# Patient Record
Sex: MALE | Race: WHITE | Employment: OTHER | URBAN - METROPOLITAN AREA
[De-identification: names, ages, dates, MRNs, and addresses within clinical notes are randomized per-mention and may not be internally consistent; named-entity substitution may affect disease eponyms.]

---

## 2018-10-11 ENCOUNTER — TRANSFERRED RECORDS (OUTPATIENT)
Dept: HEALTH INFORMATION MANAGEMENT | Facility: CLINIC | Age: 78
End: 2018-10-11

## 2018-10-17 ENCOUNTER — OFFICE VISIT (OUTPATIENT)
Dept: SURGERY | Facility: OTHER | Age: 78
End: 2018-10-17
Attending: SURGERY
Payer: COMMERCIAL

## 2018-10-17 VITALS
BODY MASS INDEX: 28.45 KG/M2 | SYSTOLIC BLOOD PRESSURE: 100 MMHG | TEMPERATURE: 97.6 F | HEIGHT: 66 IN | OXYGEN SATURATION: 97 % | HEART RATE: 75 BPM | WEIGHT: 177 LBS | DIASTOLIC BLOOD PRESSURE: 66 MMHG

## 2018-10-17 DIAGNOSIS — N52.9 IMPOTENCE OF ORGANIC ORIGIN: ICD-10-CM

## 2018-10-17 DIAGNOSIS — Z22.7 LATENT TUBERCULOSIS BY SKIN TEST: ICD-10-CM

## 2018-10-17 DIAGNOSIS — K40.90 INGUINAL HERNIA OF LEFT SIDE WITHOUT OBSTRUCTION OR GANGRENE: ICD-10-CM

## 2018-10-17 DIAGNOSIS — I70.8 AORTO-ILIAC ATHEROSCLEROSIS (H): ICD-10-CM

## 2018-10-17 DIAGNOSIS — N18.30 CHRONIC KIDNEY DISEASE, STAGE 3 (H): ICD-10-CM

## 2018-10-17 DIAGNOSIS — M19.90 OSTEOARTHRITIS: ICD-10-CM

## 2018-10-17 DIAGNOSIS — M51.26 DISPLACEMENT OF LUMBAR INTERVERTEBRAL DISC WITHOUT MYELOPATHY: ICD-10-CM

## 2018-10-17 DIAGNOSIS — I48.0 PAROXYSMAL ATRIAL FIBRILLATION WITH RVR (H): ICD-10-CM

## 2018-10-17 DIAGNOSIS — K63.5 COLON POLYP: ICD-10-CM

## 2018-10-17 DIAGNOSIS — S83.212A BUCKET HANDLE TEAR OF MEDIAL MENISCUS OF LEFT KNEE: ICD-10-CM

## 2018-10-17 DIAGNOSIS — Z95.5 HISTORY OF HEART ARTERY STENT: ICD-10-CM

## 2018-10-17 DIAGNOSIS — E78.49 OTHER HYPERLIPIDEMIA: ICD-10-CM

## 2018-10-17 DIAGNOSIS — I70.0 AORTO-ILIAC ATHEROSCLEROSIS (H): ICD-10-CM

## 2018-10-17 DIAGNOSIS — K40.91 UNILATERAL RECURRENT INGUINAL HERNIA WITHOUT OBSTRUCTION OR GANGRENE: Primary | ICD-10-CM

## 2018-10-17 DIAGNOSIS — I25.10 CORONARY ARTERY DISEASE INVOLVING NATIVE HEART WITHOUT ANGINA PECTORIS: ICD-10-CM

## 2018-10-17 PROCEDURE — G0463 HOSPITAL OUTPT CLINIC VISIT: HCPCS

## 2018-10-17 PROCEDURE — 99203 OFFICE O/P NEW LOW 30 MIN: CPT | Performed by: SURGERY

## 2018-10-17 RX ORDER — NITROGLYCERIN 0.4 MG/1
0.4 TABLET SUBLINGUAL EVERY 5 MIN PRN
COMMUNITY

## 2018-10-17 RX ORDER — OMEGA-3 FATTY ACIDS/FISH OIL 300-1000MG
1 CAPSULE ORAL DAILY
COMMUNITY

## 2018-10-17 RX ORDER — PRAMIPEXOLE DIHYDROCHLORIDE 0.25 MG/1
1 TABLET ORAL EVERY EVENING
Refills: 2 | COMMUNITY
Start: 2018-06-04 | End: 2018-11-30

## 2018-10-17 RX ORDER — ATORVASTATIN CALCIUM 40 MG/1
1 TABLET, FILM COATED ORAL AT BEDTIME
Refills: 3 | COMMUNITY
Start: 2018-08-28

## 2018-10-17 RX ORDER — RIVAROXABAN 20 MG/1
1 TABLET, FILM COATED ORAL EVERY MORNING
Refills: 3 | COMMUNITY
Start: 2018-10-11

## 2018-10-17 RX ORDER — METOPROLOL SUCCINATE 200 MG/1
1 TABLET, EXTENDED RELEASE ORAL DAILY
Refills: 3 | COMMUNITY
Start: 2018-07-16

## 2018-10-17 RX ORDER — DILTIAZEM HYDROCHLORIDE 120 MG/1
1 CAPSULE, EXTENDED RELEASE ORAL DAILY
Refills: 3 | COMMUNITY
Start: 2018-10-11

## 2018-10-17 RX ORDER — FAMOTIDINE 20 MG
1 TABLET ORAL DAILY
COMMUNITY

## 2018-10-17 RX ORDER — ASPIRIN 81 MG/1
81 TABLET ORAL DAILY
COMMUNITY

## 2018-10-17 RX ORDER — CEFAZOLIN SODIUM 1 G/50ML
1 INJECTION, SOLUTION INTRAVENOUS SEE ADMIN INSTRUCTIONS
Status: CANCELLED | OUTPATIENT
Start: 2018-10-17

## 2018-10-17 RX ORDER — CEFAZOLIN SODIUM 2 G/100ML
2 INJECTION, SOLUTION INTRAVENOUS
Status: CANCELLED | OUTPATIENT
Start: 2018-10-17

## 2018-10-17 RX ORDER — ACETAMINOPHEN 325 MG/1
975 TABLET ORAL ONCE
Status: CANCELLED | OUTPATIENT
Start: 2018-10-17 | End: 2018-10-17

## 2018-10-17 RX ORDER — TRAMADOL HYDROCHLORIDE 50 MG/1
1 TABLET ORAL EVERY 8 HOURS PRN
Refills: 0 | COMMUNITY
Start: 2018-01-08

## 2018-10-17 ASSESSMENT — PAIN SCALES - GENERAL: PAINLEVEL: MODERATE PAIN (5)

## 2018-10-17 NOTE — MR AVS SNAPSHOT
After Visit Summary   10/17/2018    Ian Worrell    MRN: [id number]           Patient Information     Date Of Birth          1940        Visit Information        Provider Department      10/17/2018 11:00 AM Fernando Carroll MD [facility]        Today's Diagnoses     History of heart artery stent          Care Instructions    Thank you for allowing Dr. Carroll and our surgical team to participate in your care.  If you have a scheduling or appointment question please contact our Health Unit Coordinator at her direct line [phone].   ALL nursing questions or concerns can be directed to your clinic surgical nurse at: [phone] or [phone]   Call the surgery nurse or your primary care provider if you should become ill within 1-2 weeks of your procedure and we will reschedule it when you are healthy. This includes signs or symptoms of a cold or the flu. This can include fever, chills, sore throat, cough, chest congestions, productive cough, runny nose.    You are scheduled for : repair of left inguinal hernia  Your procedure date is: 11/2/18 (may need to move to 11/6/18)  Admit time: Hospital admitting will call you the day before your surgery with your arrival time. If you are scheduled on a Monday, you will be contacted the Friday before surgery.     HOW TO PREPARE-    Preop appointment needed within the 30 days prior to your procedure. Please call the office of your primary provider to schedule this appointment.     Please call our Surgery Education Nurses 1-2 weeks prior to your surgery date at  [phone] or toll free [phone]. Please have you medication and allergy lists ready.     A responsible adult friend or family member must be available to drive you home and stay with ou for 24 hours after you leave the hospital. You will not be allowed to drive yourself. If you need to take a taxi or the bus you MUST have a responsible adult to ride with you. YOUR  PROCEDURE WILL BE CANCELLED IF YOU DO NOT HAVE A RESPONSIBLE ADULT TO DRIVE YOU HOME.     Stop your Aspirin (325mg) or other NSAIDs(Ibuprofen, Motrin, Aleve, Celebrex, Naproxen, etc...), vitamins and supplements 7 days before your surgery unless otherwise instructed.    You will need to wash the night before AND the morning of you procedure with the supplied Hibiclens following the instructions in your surgery handbook.     Unless otherwise instructed, DO NOT have anything to eat or drink after midnight the night before your surgery (or 8 hours prior to surgery), except clear liquids (water, clear juice, clear broth, plain coffee or tea without cream or milk) up until 2 hours prior to arrival time. Your stomach needs to be completely empty. Do NOT chew gum, suck on hard candy, or smoke. You can brush your teeth the morning of surgery.     Return to clinic for a postop appointment 1-2 week(s) from your surgery date. This will be determined at the time of procedure and arranged for you.             Follow-ups after your visit        Who to contact     If you have questions or need follow up information about today's clinic visit or your schedule please contact [facility] directly at [phone].  Normal or non-critical lab and imaging results will be communicated to you by MyChart, letter or phone within 4 business days after the clinic has received the results. If you do not hear from us within 7 days, please contact the clinic through MyChart or phone. If you have a critical or abnormal lab result, we will notify you by phone as soon as possible.  Submit refill requests through [employer] or call your pharmacy and they will forward the refill request to us. Please allow 3 business days for your refill to be completed.          Additional Information About Your Visit        Care EveryWhere ID     This is your Care EveryWhere ID. This could be used by other organizations to access your [facility]  "medical records  [id number]        Your Vitals Were     Pulse Temperature Height Pulse Oximetry BMI (Body Mass Index)       75 97.6  F (36.4  C) 5' 6\" (1.676 m) 97% 28.57 kg/m2        Blood Pressure from Last 3 Encounters:   10/17/18 100/66    Weight from Last 3 Encounters:   10/17/18 177 lb (80.3 kg)              Today, you had the following     No orders found for display      Information about OPIOIDS     PRESCRIPTION OPIOIDS: WHAT YOU NEED TO KNOW   We gave you an opioid (narcotic) pain medicine. It is important to manage your pain, but opioids are not always the best choice. You should first try all the other options your care team gave you. Take this medicine for as short a time (and as few doses) as possible.    Some activities can increase your pain, such as bandage changes or therapy sessions. It may help to take your pain medicine 30 to 60 minutes before these activities. Reduce your stress by getting enough sleep, working on hobbies you enjoy and practicing relaxation or meditation. Talk to your care team about ways to manage your pain beyond prescription opioids.    These medicines have risks:    DO NOT drive when on new or higher doses of pain medicine. These medicines can affect your alertness and reaction times, and you could be arrested for driving under the influence (DUI). If you need to use opioids long-term, talk to your care team about driving.    DO NOT operate heavy machinery    DO NOT do any other dangerous activities while taking these medicines.    DO NOT drink any alcohol while taking these medicines.     If the opioid prescribed includes acetaminophen, DO NOT take with any other medicines that contain acetaminophen. Read all labels carefully. Look for the word  acetaminophen  or  Tylenol.  Ask your pharmacist if you have questions or are unsure.    You can get addicted to pain medicines, especially if you have a history of addiction (chemical, alcohol or substance dependence). Talk to " your care team about ways to reduce this risk.    All opioids tend to cause constipation. Drink plenty of water and eat foods that have a lot of fiber, such as fruits, vegetables, prune juice, apple juice and high-fiber cereal. Take a laxative (Miralax, milk of magnesia, Colace, Senna) if you don t move your bowels at least every other day. Other side effects include upset stomach, sleepiness, dizziness, throwing up, tolerance (needing more of the medicine to have the same effect), physical dependence and slowed breathing.    Store your pills in a secure place, locked if possible. We will not replace any lost or stolen medicine. If you don t finish your medicine, please throw away (dispose) as directed by your pharmacist. The [place] Pollution [employer] has more information about safe disposal: [email or web address]://[email or web address]/[email or web address]-medications         Primary Care Provider Office Phone # Fax #    Anand CALOS Bertrand [phone]       [place] [address] [address] [facility] [address]        Equal Access to Services     ALY TORRES : Hadii jessica ku hadasho Sokinjalali, waaxda luqadaha, qaybta kaalmada ademeliton, fidencio pat . [facility] [phone].    ATENCIÓN: Si habla español, tiene a tobias disposición servicios gratuitos de asistencia lingüística. [facility] [phone].    We comply with applicable federal civil rights laws and [place] laws. We do not discriminate on the basis of race, color, national origin, age, disability, sex, sexual orientation, or gender identity.            Thank you!     Thank you for choosing [facility] [place]  for your care. Our goal is always to provide you with excellent care. Hearing back from our patients is one way we can continue to improve our services. Please take a few minutes to complete the written survey that you may receive in the mail after your visit with us. Thank you!             Your  Updated Medication List - Protect others around you: Learn how to safely use, store and throw away your medicines at www.disposemymeds.org.          This list is accurate as of 10/17/18 11:44 AM.  Always use your most recent med list.                   Brand Name Dispense Instructions for use Diagnosis    aspirin 81 MG EC tablet      Take 81 mg by mouth daily        atorvastatin 40 MG tablet    LIPITOR     Take 1 tablet by mouth At Bedtime        DILT- MG 24 hr capsule   Generic drug:  diltiazem      Take 1 capsule by mouth daily        metoprolol succinate 200 MG 24 hr tablet    TOPROL-XL     Take 1 tablet by mouth daily        MULTIPLE VITAMIN PO      Take 1 tablet by mouth daily        nitroGLYcerin 0.4 MG sublingual tablet    NITROSTAT     Place 0.4 mg under the tongue every 5 minutes as needed for chest pain (MAXIMUM 3 DOSES) For chest pain place 1 tablet under the tongue every 5 minutes for 3 doses. If symptoms persist 5 minutes after 1st dose call 911.        omega 3 1000 MG Caps      Take 1 capsule by mouth daily        omeprazole 20 MG CR capsule    priLOSEC     Take 1 capsule by mouth daily        pramipexole 0.25 MG tablet    MIRAPEX     Take 1 tablet by mouth every evening        traMADol 50 MG tablet    ULTRAM     Take 1 tablet by mouth every 8 hours as needed        Vitamin D (Cholecalciferol) 1000 units Caps      Take 1 capsule by mouth daily        XARELTO 20 MG Tabs tablet   Generic drug:  rivaroxaban ANTICOAGULANT      Take 1 tablet by mouth every morning

## 2018-10-17 NOTE — CONSULTS
Consult Date:  10/17/2018      SURGICAL CONSULTATION       Mr. Worrell was seen in the clinic today at the request of the [facility] in [place] because of a left inguinal hernia.  This man started to note some discomfort in the left groin approximately 2 months ago.  This has been gradually getting worse.  He really has not noticed a lump.  He does find that if he is on his feet for any length of time or doing any lifting that he will get the discomfort.  This is relieved by lying down.  He does not have pain at night.  He does not notice pain in the right groin.  He was recently treated for some epididymitis of the right testicle.  He does state that his bowels function well.  Bowel movements do not influence the left groin pain.  Appetite has been good and his weight has been stable.  He does have some difficulties with frequent urination.  Usually has to get up 3 times a night to urinate.  States that his stream is still quite strong.      He is otherwise reasonably healthy.  He does have difficulties with atrial fibrillation.  This was first diagnosed in 2013.  He did have a coronary artery stent placed at that time but did not have a heart attack.  He was admitted into hospital in [place] approximately 3 weeks ago because of rapid atrial fibrillation, but this has since been corrected.  He denies any chest pain.  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  His activity level is still quite good.  He is a nonsmoker, denies any asthma or shortness of breath.  He is not diabetic.  He has never had a stroke or threatened stroke.  There is no history of any chronic renal disease.      CURRENT MEDICATIONS:  Include aspirin, Lipitor, diltiazem, metoprolol, multivitamins, nitroglycerin, Omega-3 fatty acids, Prilosec, Mirapex, Ultram, vitamin D and Xarelto.      ALLERGIES:  CRESTOR, CYCLOBENZAPRINE, ATORVASTATIN, THOUGH HE IS ON NOT, ALEVE AND PREVACID.      PAST SURGICAL HISTORY:  He has had a previous  left rotator cuff surgery, has had coronary artery stenting.  He has also had an appendectomy at age 18, not had any other significant surgeries.      PHYSICAL EXAMINATION:   GENERAL:  On examination today, he is a healthy-appearing, 78-year-old male who is in no distress.   HEAD AND NECK:  Unremarkable.  There are no masses.  There is no thyroid enlargement.  There is no scleral icterus.   CHEST:  He does have good air entry bilaterally.  There are no wheezes or crepitations.   HEART:  Sounds are normal with normal S1 and S2.  He is in atrial fibrillation with a heart rate of 75.   ABDOMEN:  Soft.  It was nontender.  There are no palpable masses.  There is no hepatosplenomegaly.  There is no costovertebral angle tenderness.     GROINS:  I could not feel a hernia on the right side.  He was minimally tender in the area of the internal ring.  The testicles felt normal.  Epididymis was a bit thickened on the right side.  On the left side he does have a definite inguinal hernia.  This was evident with him standing.  He did have some tenderness in the area.  This was easily reducible with him lying down.  He does have strong femoral pulses bilaterally.      I explained to Mr. Worrell and his wife that he does have a left inguinal hernia.  He has been told he has had a hernia on the right side in the past, but currently I do not feel one.  I explained that generally we recommend that a hernia be fixed as they do get bigger with time and as they do, they do tend to cause some pain as his is.  I also explained that there is a risk of incarceration and strangulation.  I explained that the hernia is fixed as outpatient.  It would be done under local anesthetic with standby.  He would end up with an incision in the left groin.  There is a good chance that I would insert mesh.  He would likely have suture material in for approximately a week.  I would ask him to avoid lifting for approximately 6 weeks.  I did go over the risks  of the procedure, namely the risks of the anesthetic, bleeding, infection, injury to the spermatic cord with resulting testicular atrophy, as well as a 2%-3% risk of recurrence.   There is also small risk of chronic nerve irritation.  This is in addition to the risks of any major surgery as far as his heart, lungs, blood clots, pneumonias and strokes are concerned.      I did recommend that he stop his Xarelto 2 days prior to the procedure.  We would then restart it likely the day following the procedure.  I did discuss with him and his wife that there is a small risk of a stroke while he is off his Xarelto.  At the moment there is no need for bridging.      He is agreeable to having the procedure done.  A consent was signed.  Arrangements are being made for it to be carried out in early November.  He is going to see Dr. Bertrand for preoperative assessment.      Thank you for asking me to see Mr. Beck.         MD SOLEDAD Richard MD             D: 10/17/2018   T: 10/17/2018   MT: CC      Name:     SMITA BECK   MRN:      [id number]        Account:       [id number]   :      1940           Consult Date:  10/17/2018      Document: [id number]       cc: Anand Bertrand MD

## 2018-10-17 NOTE — PATIENT INSTRUCTIONS
Thank you for allowing Dr. Carroll and our surgical team to participate in your care.  If you have a scheduling or appointment question please contact our Health Unit Coordinator at her direct line [phone].   ALL nursing questions or concerns can be directed to your clinic surgical nurse at: [phone] or [phone]   Call the surgery nurse or your primary care provider if you should become ill within 1-2 weeks of your procedure and we will reschedule it when you are healthy. This includes signs or symptoms of a cold or the flu. This can include fever, chills, sore throat, cough, chest congestions, productive cough, runny nose.    You are scheduled for : repair of left inguinal hernia  Your procedure date is: 11/2/18 (may need to move to 11/6/18)  Admit time: Hospital admitting will call you the day before your surgery with your arrival time. If you are scheduled on a Monday, you will be contacted the Friday before surgery.     HOW TO PREPARE-    Preop appointment needed within the 30 days prior to your procedure. Please call the office of your primary provider to schedule this appointment.     Please call our Surgery Education Nurses 1-2 weeks prior to your surgery date at  [phone] or toll free [phone]. Please have you medication and allergy lists ready.     A responsible adult friend or family member must be available to drive you home and stay with ou for 24 hours after you leave the hospital. You will not be allowed to drive yourself. If you need to take a taxi or the bus you MUST have a responsible adult to ride with you. YOUR PROCEDURE WILL BE CANCELLED IF YOU DO NOT HAVE A RESPONSIBLE ADULT TO DRIVE YOU HOME.     Stop your Aspirin (325mg) or other NSAIDs(Ibuprofen, Motrin, Aleve, Celebrex, Naproxen, etc...), vitamins and supplements 7 days before your surgery unless otherwise instructed.    You will need to wash the night before AND the morning of you procedure with the supplied Hibiclens  following the instructions in your surgery handbook.     Unless otherwise instructed, DO NOT have anything to eat or drink after midnight the night before your surgery (or 8 hours prior to surgery), except clear liquids (water, clear juice, clear broth, plain coffee or tea without cream or milk) up until 2 hours prior to arrival time. Your stomach needs to be completely empty. Do NOT chew gum, suck on hard candy, or smoke. You can brush your teeth the morning of surgery.     Return to clinic for a postop appointment 1-2 week(s) from your surgery date. This will be determined at the time of procedure and arranged for you.

## 2018-10-17 NOTE — NURSING NOTE
"Chief Complaint   Patient presents with     Consult For     hernia. Referred by [place] [facility].       Initial /66  Pulse 75  Temp 97.6  F (36.4  C)  Ht 5' 6\" (1.676 m)  Wt 177 lb (80.3 kg)  SpO2 97%  BMI 28.57 kg/m2 Estimated body mass index is 28.57 kg/(m^2) as calculated from the following:    Height as of this encounter: 5' 6\" (1.676 m).    Weight as of this encounter: 177 lb (80.3 kg).  Medication Reconciliation: complete    GERRY DUMONT LPN    "

## 2018-10-18 PROBLEM — K40.90 INGUINAL HERNIA OF LEFT SIDE WITHOUT OBSTRUCTION OR GANGRENE: Status: ACTIVE | Noted: 2018-10-18

## 2018-10-18 PROBLEM — Z22.7 LATENT TUBERCULOSIS BY SKIN TEST: Status: ACTIVE | Noted: 2017-06-27

## 2018-10-18 PROBLEM — K63.5 COLON POLYP: Status: ACTIVE | Noted: 2018-10-18

## 2018-10-18 PROBLEM — M19.90 OSTEOARTHRITIS: Status: ACTIVE | Noted: 2018-10-18

## 2018-10-18 PROBLEM — N18.30 CHRONIC KIDNEY DISEASE, STAGE 3 (H): Status: ACTIVE | Noted: 2018-10-18

## 2018-10-18 PROBLEM — N52.9 IMPOTENCE OF ORGANIC ORIGIN: Status: ACTIVE | Noted: 2018-10-18

## 2018-10-18 PROBLEM — S83.212A BUCKET HANDLE TEAR OF MEDIAL MENISCUS OF LEFT KNEE: Status: ACTIVE | Noted: 2018-10-18

## 2018-10-18 PROBLEM — E78.49 OTHER HYPERLIPIDEMIA: Status: ACTIVE | Noted: 2018-10-18

## 2018-10-18 PROBLEM — I48.0 PAROXYSMAL ATRIAL FIBRILLATION WITH RVR (H): Status: ACTIVE | Noted: 2018-09-18

## 2018-11-01 ENCOUNTER — ANESTHESIA EVENT (OUTPATIENT)
Dept: SURGERY | Facility: HOSPITAL | Age: 78
End: 2018-11-01
Payer: COMMERCIAL

## 2018-11-01 NOTE — ANESTHESIA PREPROCEDURE EVALUATION
Anesthesia Evaluation     . Pt has had prior anesthetic. Type: General and MAC    No history of anesthetic complications          ROS/MED HX    ENT/Pulmonary:  - neg pulmonary ROS     Neurologic:  - neg neurologic ROS     Cardiovascular:     (+) Dyslipidemia, --CAD, --stent,x1 in 2013 Bare Metal Stent .. Taking blood thinners : . . . :. .       METS/Exercise Tolerance:     Hematologic:         Musculoskeletal:   (+) arthritis, , , -       GI/Hepatic:  - neg GI/hepatic ROS       Renal/Genitourinary:     (+) chronic renal disease, type: CRI,       Endo:  - neg endo ROS       Psychiatric:         Infectious Disease:  - neg infectious disease ROS       Malignancy:      - no malignancy   Other:    - neg other ROS                 Physical Exam      Airway   Mallampati: II  TM distance: >3 FB  Neck ROM: full    Dental   (+) missing and chipped    Cardiovascular   Rhythm and rate: regular and normal      Pulmonary    breath sounds clear to auscultation                    Anesthesia Plan      History & Physical Review  History and physical reviewed and following examination; no interval change.    ASA Status:  3 .    NPO Status:  > 8 hours    Plan for MAC with Intravenous induction. Reason for MAC:  Difficulty with conscious sedation (QS), Extreme anxiety (QS) and Deep or markedly invasive procedure (G8)  PONV prophylaxis:  Ondansetron (or other 5HT-3)       Postoperative Care      Consents  Anesthetic plan, risks, benefits and alternatives discussed with:  Patient..                          .

## 2018-11-02 ENCOUNTER — HOSPITAL ENCOUNTER (OUTPATIENT)
Facility: HOSPITAL | Age: 78
Discharge: HOME | End: 2018-11-02
Attending: SURGERY | Admitting: SURGERY
Payer: COMMERCIAL

## 2018-11-02 ENCOUNTER — ANESTHESIA (OUTPATIENT)
Dept: SURGERY | Facility: HOSPITAL | Age: 78
End: 2018-11-02
Payer: COMMERCIAL

## 2018-11-02 VITALS
DIASTOLIC BLOOD PRESSURE: 83 MMHG | WEIGHT: 181 LBS | SYSTOLIC BLOOD PRESSURE: 144 MMHG | OXYGEN SATURATION: 96 % | BODY MASS INDEX: 27.43 KG/M2 | HEIGHT: 68 IN | RESPIRATION RATE: 18 BRPM | HEART RATE: 56 BPM

## 2018-11-02 DIAGNOSIS — K40.90 INGUINAL HERNIA OF LEFT SIDE WITHOUT OBSTRUCTION OR GANGRENE: Primary | ICD-10-CM

## 2018-11-02 PROCEDURE — 37000008 ZZH ANESTHESIA TECHNICAL FEE, 1ST 30 MIN: Performed by: SURGERY

## 2018-11-02 PROCEDURE — 49525 REPAIR ING HERNIA SLIDING: CPT | Performed by: SURGERY

## 2018-11-02 PROCEDURE — 25000132 ZZH RX MED GY IP 250 OP 250 PS 637: Performed by: SURGERY

## 2018-11-02 PROCEDURE — 25000128 H RX IP 250 OP 636: Performed by: ANESTHESIOLOGY

## 2018-11-02 PROCEDURE — 36000052 ZZH SURGERY LEVEL 2 EA 15 ADDTL MIN: Performed by: SURGERY

## 2018-11-02 PROCEDURE — 93005 ELECTROCARDIOGRAM TRACING: CPT

## 2018-11-02 PROCEDURE — 25000132 ZZH RX MED GY IP 250 OP 250 PS 637: Performed by: ANESTHESIOLOGY

## 2018-11-02 PROCEDURE — 27110028 ZZH OR GENERAL SUPPLY NON-STERILE: Performed by: SURGERY

## 2018-11-02 PROCEDURE — 49505 PRP I/HERN INIT REDUC >5 YR: CPT | Performed by: ANESTHESIOLOGY

## 2018-11-02 PROCEDURE — 37000009 ZZH ANESTHESIA TECHNICAL FEE, EACH ADDTL 15 MIN: Performed by: SURGERY

## 2018-11-02 PROCEDURE — C1781 MESH (IMPLANTABLE): HCPCS | Performed by: SURGERY

## 2018-11-02 PROCEDURE — 40000065 ZZH STATISTIC EKG NON-CHARGEABLE

## 2018-11-02 PROCEDURE — 25000128 H RX IP 250 OP 636: Performed by: SURGERY

## 2018-11-02 PROCEDURE — 71000027 ZZH RECOVERY PHASE 2 EACH 15 MINS: Performed by: SURGERY

## 2018-11-02 PROCEDURE — 25000128 H RX IP 250 OP 636: Performed by: NURSE ANESTHETIST, CERTIFIED REGISTERED

## 2018-11-02 PROCEDURE — 99100 ANES PT EXTEME AGE<1 YR&>70: CPT | Performed by: NURSE ANESTHETIST, CERTIFIED REGISTERED

## 2018-11-02 PROCEDURE — 27210794 ZZH OR GENERAL SUPPLY STERILE: Performed by: SURGERY

## 2018-11-02 PROCEDURE — 49505 PRP I/HERN INIT REDUC >5 YR: CPT | Performed by: NURSE ANESTHETIST, CERTIFIED REGISTERED

## 2018-11-02 PROCEDURE — 25000125 ZZHC RX 250: Performed by: NURSE ANESTHETIST, CERTIFIED REGISTERED

## 2018-11-02 PROCEDURE — 36000050 ZZH SURGERY LEVEL 2 1ST 30 MIN: Performed by: SURGERY

## 2018-11-02 PROCEDURE — 40000305 ZZH STATISTIC PRE PROC ASSESS I: Performed by: SURGERY

## 2018-11-02 PROCEDURE — 25000125 ZZHC RX 250: Performed by: SURGERY

## 2018-11-02 DEVICE — MESH-PARIETENE FLAT SHEET 6" X 4": Type: IMPLANTABLE DEVICE | Site: INGUINAL | Status: FUNCTIONAL

## 2018-11-02 RX ORDER — NALOXONE HYDROCHLORIDE 0.4 MG/ML
.1-.4 INJECTION, SOLUTION INTRAMUSCULAR; INTRAVENOUS; SUBCUTANEOUS
Status: DISCONTINUED | OUTPATIENT
Start: 2018-11-02 | End: 2018-11-02 | Stop reason: HOSPADM

## 2018-11-02 RX ORDER — SODIUM CHLORIDE, SODIUM LACTATE, POTASSIUM CHLORIDE, CALCIUM CHLORIDE 600; 310; 30; 20 MG/100ML; MG/100ML; MG/100ML; MG/100ML
INJECTION, SOLUTION INTRAVENOUS CONTINUOUS
Status: DISCONTINUED | OUTPATIENT
Start: 2018-11-02 | End: 2018-11-02 | Stop reason: HOSPADM

## 2018-11-02 RX ORDER — OXYCODONE HYDROCHLORIDE 5 MG/1
5 TABLET ORAL ONCE
Status: COMPLETED | OUTPATIENT
Start: 2018-11-02 | End: 2018-11-02

## 2018-11-02 RX ORDER — ONDANSETRON 2 MG/ML
INJECTION INTRAMUSCULAR; INTRAVENOUS PRN
Status: DISCONTINUED | OUTPATIENT
Start: 2018-11-02 | End: 2018-11-02

## 2018-11-02 RX ORDER — PROPOFOL 10 MG/ML
INJECTION, EMULSION INTRAVENOUS CONTINUOUS PRN
Status: DISCONTINUED | OUTPATIENT
Start: 2018-11-02 | End: 2018-11-02

## 2018-11-02 RX ORDER — ACETAMINOPHEN 325 MG/1
975 TABLET ORAL ONCE
Status: COMPLETED | OUTPATIENT
Start: 2018-11-02 | End: 2018-11-02

## 2018-11-02 RX ORDER — CEFAZOLIN SODIUM 2 G/100ML
2 INJECTION, SOLUTION INTRAVENOUS
Status: COMPLETED | OUTPATIENT
Start: 2018-11-02 | End: 2018-11-02

## 2018-11-02 RX ORDER — ONDANSETRON 4 MG/1
4 TABLET, ORALLY DISINTEGRATING ORAL EVERY 30 MIN PRN
Status: DISCONTINUED | OUTPATIENT
Start: 2018-11-02 | End: 2018-11-02 | Stop reason: HOSPADM

## 2018-11-02 RX ORDER — ONDANSETRON 2 MG/ML
4 INJECTION INTRAMUSCULAR; INTRAVENOUS EVERY 30 MIN PRN
Status: DISCONTINUED | OUTPATIENT
Start: 2018-11-02 | End: 2018-11-02 | Stop reason: HOSPADM

## 2018-11-02 RX ORDER — FENTANYL CITRATE 50 UG/ML
25-50 INJECTION, SOLUTION INTRAMUSCULAR; INTRAVENOUS
Status: DISCONTINUED | OUTPATIENT
Start: 2018-11-02 | End: 2018-11-02 | Stop reason: HOSPADM

## 2018-11-02 RX ORDER — MEPERIDINE HYDROCHLORIDE 50 MG/ML
12.5 INJECTION INTRAMUSCULAR; INTRAVENOUS; SUBCUTANEOUS
Status: DISCONTINUED | OUTPATIENT
Start: 2018-11-02 | End: 2018-11-02 | Stop reason: HOSPADM

## 2018-11-02 RX ORDER — LIDOCAINE HYDROCHLORIDE 20 MG/ML
INJECTION, SOLUTION EPIDURAL; INFILTRATION; INTRACAUDAL; PERINEURAL PRN
Status: DISCONTINUED | OUTPATIENT
Start: 2018-11-02 | End: 2018-11-02 | Stop reason: HOSPADM

## 2018-11-02 RX ORDER — CEFAZOLIN SODIUM 1 G/3ML
1 INJECTION, POWDER, FOR SOLUTION INTRAMUSCULAR; INTRAVENOUS SEE ADMIN INSTRUCTIONS
Status: DISCONTINUED | OUTPATIENT
Start: 2018-11-02 | End: 2018-11-02 | Stop reason: HOSPADM

## 2018-11-02 RX ORDER — FENTANYL CITRATE 50 UG/ML
INJECTION, SOLUTION INTRAMUSCULAR; INTRAVENOUS PRN
Status: DISCONTINUED | OUTPATIENT
Start: 2018-11-02 | End: 2018-11-02

## 2018-11-02 RX ORDER — OXYCODONE HYDROCHLORIDE 5 MG/1
5 TABLET ORAL EVERY 6 HOURS PRN
Qty: 15 TABLET | Refills: 0 | Status: SHIPPED | OUTPATIENT
Start: 2018-11-02 | End: 2018-11-08

## 2018-11-02 RX ORDER — BUPIVACAINE HYDROCHLORIDE 5 MG/ML
INJECTION, SOLUTION PERINEURAL PRN
Status: DISCONTINUED | OUTPATIENT
Start: 2018-11-02 | End: 2018-11-02 | Stop reason: HOSPADM

## 2018-11-02 RX ORDER — EPHEDRINE SULFATE 50 MG/ML
INJECTION, SOLUTION INTRAMUSCULAR; INTRAVENOUS; SUBCUTANEOUS PRN
Status: DISCONTINUED | OUTPATIENT
Start: 2018-11-02 | End: 2018-11-02

## 2018-11-02 RX ADMIN — SODIUM CHLORIDE, POTASSIUM CHLORIDE, SODIUM LACTATE AND CALCIUM CHLORIDE: 600; 310; 30; 20 INJECTION, SOLUTION INTRAVENOUS at 10:43

## 2018-11-02 RX ADMIN — ACETAMINOPHEN 975 MG: 325 TABLET, FILM COATED ORAL at 10:45

## 2018-11-02 RX ADMIN — Medication 5 MG: at 12:39

## 2018-11-02 RX ADMIN — MIDAZOLAM 2 MG: 1 INJECTION INTRAMUSCULAR; INTRAVENOUS at 11:59

## 2018-11-02 RX ADMIN — FENTANYL CITRATE 25 MCG: 50 INJECTION, SOLUTION INTRAMUSCULAR; INTRAVENOUS at 12:26

## 2018-11-02 RX ADMIN — SODIUM CHLORIDE, POTASSIUM CHLORIDE, SODIUM LACTATE AND CALCIUM CHLORIDE: 600; 310; 30; 20 INJECTION, SOLUTION INTRAVENOUS at 11:59

## 2018-11-02 RX ADMIN — FENTANYL CITRATE 25 MCG: 50 INJECTION, SOLUTION INTRAMUSCULAR; INTRAVENOUS at 13:34

## 2018-11-02 RX ADMIN — PROPOFOL 50 MCG/KG/MIN: 10 INJECTION, EMULSION INTRAVENOUS at 12:18

## 2018-11-02 RX ADMIN — FENTANYL CITRATE 50 MCG: 50 INJECTION, SOLUTION INTRAMUSCULAR; INTRAVENOUS at 15:08

## 2018-11-02 RX ADMIN — OXYCODONE HYDROCHLORIDE 5 MG: 5 TABLET ORAL at 15:03

## 2018-11-02 RX ADMIN — CEFAZOLIN SODIUM 2 G: 2 INJECTION, SOLUTION INTRAVENOUS at 11:59

## 2018-11-02 RX ADMIN — ONDANSETRON 4 MG: 2 INJECTION INTRAMUSCULAR; INTRAVENOUS at 11:59

## 2018-11-02 RX ADMIN — Medication 5 MG: at 12:41

## 2018-11-02 RX ADMIN — FENTANYL CITRATE 50 MCG: 50 INJECTION, SOLUTION INTRAMUSCULAR; INTRAVENOUS at 14:22

## 2018-11-02 NOTE — ANESTHESIA CARE TRANSFER NOTE
Patient: Ian Worrell    Procedure(s):  REPAIR LEFT INGUINAL HERNIA WITH MESH    Diagnosis: LEFT INGUINAL HERNIA  Diagnosis Additional Information: No value filed.    Anesthesia Type:   MAC     Note:  Airway :Room Air  Patient transferred to:Phase II  Handoff Report: Identifed the Patient, Identified the Reponsible Provider, Reviewed the pertinent medical history, Discussed the surgical course, Reviewed Intra-OP anesthesia mangement and issues during anesthesia, Set expectations for post-procedure period and Allowed opportunity for questions and acknowledgement of understanding      Vitals: (Last set prior to Anesthesia Care Transfer)    CRNA VITALS  11/2/2018 1341 - 11/2/2018 1415      11/2/2018             NIBP: 107/66    NIBP Mean: 82    Resp Rate (set): 8                Electronically Signed By: DANIELLA Shoemaker CRNA  November 2, 2018  2:15 PM

## 2018-11-02 NOTE — OR NURSING
Pt eating, drinking, and tolerating well. Denies pain at this time. Pt did have mild pain when first brought to phase 2. Pain 2-3 out of 10.  Wife requested pain medications. Discharge instructions discussed with Pt, wife, and daughter. No additional questions at this time. Pt urinated 150mls of corinna urine prior to discharge without difficulty. IV removed. Dressed with assistance from wife, and escorted to vehicle via wheelchair. Bess 19.

## 2018-11-02 NOTE — BRIEF OP NOTE
[facility] Brief Operative Note    Pre-operative diagnosis: LEFT INGUINAL HERNIA   Post-operative diagnosis Left inguinal hernia   Procedure: Procedure(s):  REPAIR LEFT INGUINAL HERNIA WITH MESH   Surgeon: Fernando Carroll MD   Assistants(s):    Estimated blood loss: Less than 50 ml    Specimens: None   Findings: Indirect hernia with colon caught in hernia

## 2018-11-02 NOTE — IP AVS SNAPSHOT
[address] [address]    Phone:  [phone]                                       After Visit Summary   11/2/2018    Ian Worrell    MRN: [id number]           After Visit Summary Signature Page     I have received my discharge instructions, and my questions have been answered. I have discussed any challenges I see with this plan with the nurse or doctor.    ..........................................................................................................................................  Patient/Patient Representative Signature      ..........................................................................................................................................  Patient Representative Print Name and Relationship to Patient    ..................................................               ................................................  Date                                   Time    ..........................................................................................................................................  Reviewed by Signature/Title    ...................................................              ..............................................  Date                                               Time          22EPIC Rev 08/18

## 2018-11-02 NOTE — DISCHARGE INSTRUCTIONS
POST OPERATIVE PATIENT INFORMATION  Hernia Repair      Home care following hernia repair:  You will be discharged when you are able to be driven home.  Anesthesia may reduce judgment, reaction time and coordination for several hours after you seemingly have recovered.  Therefore, do not operate any motorized vehicles or power tools for 24 hours after discharge.  You should also not be alone for {anesthesia type:974209}.    Activity:  After arriving home, it is suggested that you rest or do quiet activities for the rest of the day.  The next day you may be as active as you feel able.  You may find too, that you require more rest than usual the first 3-4 days as your body heals.  Check with your doctor when you may resume normal activities.    Diet:  Eat small amounts frequently after arriving home.  Begin with clear liquids such as ginger ale, lemon-lime drinks, Jell-O, popsicles and clear soups in small quantities.  Gradually increase your diet to include other juices, creamed soups and solids as tolerated.  Avoid foods the day of surgery which are hard to digest such as heavy, sweet, highly spiced, or fried foods.    Emesis (vomiting) sometimes occurs after general anesthesia.  If emesis persists more than 5-7 times after arriving home, or if you have other difficulties, call your doctor.    Medications:  If you have prescriptions from your doctor, take them as prescribed until they are gone and/or need to be refilled.    Other:  1. Difficulty in urination can occur post operatively.  If you have any problems, call our physician or go to the ER.  2. No lifting over 15 lbs for 4 weeks.   3. No active sports (biking, riding, skating, swimming, etc.).  4. Early on, it's common for the area around your incision to be swollen, bruised, and sore.     Dressing:  Leave your dressing on as directed:     Incision Care  Remember: Follow-up visits allow your healthcare provider to make sure your incision is healing well.  Be sure to keep your appointments.     Stitches (sutures), surgical staples, special strips of surgical tape, or surgical skin glue may be used to close incisions. They also help stop bleeding and speed healing. To help your incision heal, follow the tips on this handout.  Home care  Tips for home care include the following:    Always wash your hands before touching your incision.    Keep your incision clean and dry.    Avoid doing things that could cause dirt or sweat to get on your incision.    Don t pick at scabs. They help protect the wound.    Keep your incision out of water.    Take a sponge bath to avoid getting your incision wet, unless your healthcare provider tells you otherwise.    Ask your provider when can you take a shower or bathe.    Ask your provider about the best way to keep your incision dry when bathing or showering.    Pat stitches dry if they get wet. Don t rub.    Leave the bandage (dressing) in place until you are told to remove it or change it. Change it only as directed, using clean hands.    After the first 12 hours, change your dressing every 24 hours, or as directed by your healthcare provider.    Change your dressing if it gets wet or soiled.  Care for specific closures  Follow these guidelines unless your healthcare provider tells you otherwise:    Stitches or staples. Once you no longer need to keep these dry, clean the wound daily. First remove the bandage using clean hands. Then wash the area gently with soap and warm water. Use a wet cotton swab to loosen and remove any blood or crust that forms. After cleaning, put a thin layer of antibiotic ointment on. Then put on a new bandage.    Skin glue. Don t put liquid, ointment, or cream on your wound while the glue is in place. Avoid activities that cause heavy sweating. Protect the wound from sunlight. Do not scratch, rub, or pick at the glue. Do not put tape directly over the glue. The glue should peel off within 5 to 10  days.    Surgical tape. Keep the area dry. If it gets wet, blot the area dry with a clean towel. Surgical tape usually falls off within 7 to 10 days. If it has not fallen off after 10 days, contact your healthcare provider before taking it off yourself. If you are told to remove the tape, put mineral oil or petroleum jelly on a cotton ball. Gently rub the tape until it is removed.  Changing your dressing  Leave the dressing (bandage) in place until you are told to remove it or change it. Follow the instructions below unless told otherwise by your healthcare provider:    Always wash your hands before changing your dressing.    After the first 48 hours, the incision wound usually will have closed. At this point, leave the incision uncovered and open to the air. If the incision has not closed keep it covered.    Cover your incision only if your clothing is rubbing it or causing irritation.    Change your dressing if it gets wet or soiled.    For comfort, men may wear scrotal support after inguinal hernia repair.    Drainage:   Bleeding or drainage should be minimal.  1. If bleeding should soak the dressings, cover with another dressing. Do not remove the original dressing.  2. If bleeding continues, apply gentle steady pressure over the incision for 5 minutes.  3. If bleeding persists or there is an increased swelling of the area, call your surgeon or return to the Emergency Room.    When to call your healthcare provider:  Call your healthcare provider if you have any of the following:    A large amount of swelling or bruising (Men: may notice testicular swelling and bruising. This is normal)    Fever of 101.5*F (38*C) or higher, or as directed by your healthcare provider.    Pain, redness, bleeding, or fluid from the incision that gets worse    Drainage with an odor    Trouble urinating    Constipation    Vomiting         QUESTIONS?:  Please feel free to call your healthcare provider or the hospital  if you  have any questions, and they will be happy to assist you.         If you have any questions about these instructions, feel free to ask the nurse before discharge.  If you have difficulty after surgery and are unable to contact your physician at his/her clinic, call the [facility] Emergency Room for assistance at [phone].    Post-Anesthesia Patient Instructions    IMMEDIATELY FOLLOWING SURGERY:  Do not drive or operate machinery for the first twenty four hours after surgery.  Do not make any important decisions for twenty four hours after surgery or while taking narcotic pain medications or sedatives.  If you develop intractable nausea and vomiting or a severe headache please notify your doctor immediately.    FOLLOW-UP:  Please make an appointment with your surgeon as instructed. You do not need to follow up with anesthesia unless specifically instructed to do so.    WOUND CARE INSTRUCTIONS (if applicable):  Keep a dry clean dressing on the anesthesia/puncture wound site if there is drainage.  Once the wound has quit draining you may leave it open to air.  Generally you should leave the bandage intact for twenty four hours unless there is drainage.  If the epidural site drains for more than 36-48 hours please call the anesthesia department.    QUESTIONS?:  Please feel free to call your physician or the hospital  if you have any questions, and they will be happy to assist you.

## 2018-11-02 NOTE — ANESTHESIA POSTPROCEDURE EVALUATION
Patient: Ian Worrell    Procedure(s):  REPAIR LEFT INGUINAL HERNIA WITH MESH    Diagnosis:LEFT INGUINAL HERNIA  Diagnosis Additional Information: No value filed.    Anesthesia Type:  MAC    Note:  Anesthesia Post Evaluation    Patient location during evaluation: PACU  Patient participation: Able to fully participate in evaluation  Level of consciousness: awake  Pain management: adequate  Airway patency: patent  Cardiovascular status: acceptable  Respiratory status: acceptable  Hydration status: acceptable  PONV: none             Last vitals:  Vitals:    11/02/18 1440 11/02/18 1445 11/02/18 1450   BP: 144/88 148/89 153/86   Pulse: 56 56 52   Resp: 16 16 18   SpO2: 96% 96% 94%         Electronically Signed By: Giancarlo Ray MD  November 2, 2018  2:58 PM

## 2018-11-02 NOTE — IP AVS SNAPSHOT
MRN:[id number]                      After Visit Summary   11/2/2018    Ian Worrell    MRN: [id number]           Thank you!     Thank you for choosing [facility] for your care. Our goal is always to provide you with excellent care. Hearing back from our patients is one way we can continue to improve our services. Please take a few minutes to complete the written survey that you may receive in the mail after you visit with us. Thank you!        Patient Information     Date Of Birth          1940        About your hospital stay     You were admitted on:  November 2, 2018 You last received care in the:  [facility]    You were discharged on:  November 2, 2018        Reason for your hospital stay       Repair left inguinal hernia with mesh. Restart anticoagulation medication on Sunday                  Who to Call     For medical emergencies, please call 911.  For non-urgent questions about your medical care, please call your primary care provider or clinic, [phone]  For questions related to your surgery, please call your surgery clinic        Attending Provider     Provider Fernando Gustafson MD General Surgery       Primary Care Provider Office Phone # Fax #    Anand Bertrand [phone]      After Care Instructions     Activity       Your activity upon discharge: No lifting greater than 10 pounds for 6 weeks.            Diet       Follow this diet upon discharge: Advance to a regular diet as tolerated            Wound care and dressings       Instructions to care for your wound at home: may get incision wet in shower but do not soak or scrub. Leave dressing in place until seen in clinic.                  Follow-up Appointments     Follow-up and recommended labs and tests        Follow up with Fernando romero, next Thursday. to evaluate after surgery.  No follow up labs or test are needed.                  Your next 10 appointments already scheduled     Nov  08, 2018 10:30 AM CST   (Arrive by 10:15 AM)   Post Op with Fernando Carroll MD   [facility] ([facility] )    [address]  [place] [address]   [phone]              Further instructions from your care team           POST OPERATIVE PATIENT INFORMATION  Hernia Repair      Home care following hernia repair:  You will be discharged when you are able to be driven home.  Anesthesia may reduce judgment, reaction time and coordination for several hours after you seemingly have recovered.  Therefore, do not operate any motorized vehicles or power tools for 24 hours after discharge.  You should also not be alone for {anesthesia type:314496}.    Activity:  After arriving home, it is suggested that you rest or do quiet activities for the rest of the day.  The next day you may be as active as you feel able.  You may find too, that you require more rest than usual the first 3-4 days as your body heals.  Check with your doctor when you may resume normal activities.    Diet:  Eat small amounts frequently after arriving home.  Begin with clear liquids such as ginger ale, lemon-lime drinks, Jell-O, popsicles and clear soups in small quantities.  Gradually increase your diet to include other juices, creamed soups and solids as tolerated.  Avoid foods the day of surgery which are hard to digest such as heavy, sweet, highly spiced, or fried foods.    Emesis (vomiting) sometimes occurs after general anesthesia.  If emesis persists more than 5-7 times after arriving home, or if you have other difficulties, call your doctor.    Medications:  If you have prescriptions from your doctor, take them as prescribed until they are gone and/or need to be refilled.    Other:  1. Difficulty in urination can occur post operatively.  If you have any problems, call our physician or go to the ER.  2. No lifting over 15 lbs for 4 weeks.   3. No active sports (biking, riding, skating, swimming,  etc.).  4. Early on, it's common for the area around your incision to be swollen, bruised, and sore.     Dressing:  Leave your dressing on as directed:     Incision Care  Remember: Follow-up visits allow your healthcare provider to make sure your incision is healing well. Be sure to keep your appointments.     Stitches (sutures), surgical staples, special strips of surgical tape, or surgical skin glue may be used to close incisions. They also help stop bleeding and speed healing. To help your incision heal, follow the tips on this handout.  Home care  Tips for home care include the following:    Always wash your hands before touching your incision.    Keep your incision clean and dry.    Avoid doing things that could cause dirt or sweat to get on your incision.    Don t pick at scabs. They help protect the wound.    Keep your incision out of water.    Take a sponge bath to avoid getting your incision wet, unless your healthcare provider tells you otherwise.    Ask your provider when can you take a shower or bathe.    Ask your provider about the best way to keep your incision dry when bathing or showering.    Pat stitches dry if they get wet. Don t rub.    Leave the bandage (dressing) in place until you are told to remove it or change it. Change it only as directed, using clean hands.    After the first 12 hours, change your dressing every 24 hours, or as directed by your healthcare provider.    Change your dressing if it gets wet or soiled.  Care for specific closures  Follow these guidelines unless your healthcare provider tells you otherwise:    Stitches or staples. Once you no longer need to keep these dry, clean the wound daily. First remove the bandage using clean hands. Then wash the area gently with soap and warm water. Use a wet cotton swab to loosen and remove any blood or crust that forms. After cleaning, put a thin layer of antibiotic ointment on. Then put on a new bandage.    Skin glue. Don t put liquid,  ointment, or cream on your wound while the glue is in place. Avoid activities that cause heavy sweating. Protect the wound from sunlight. Do not scratch, rub, or pick at the glue. Do not put tape directly over the glue. The glue should peel off within 5 to 10 days.    Surgical tape. Keep the area dry. If it gets wet, blot the area dry with a clean towel. Surgical tape usually falls off within 7 to 10 days. If it has not fallen off after 10 days, contact your healthcare provider before taking it off yourself. If you are told to remove the tape, put mineral oil or petroleum jelly on a cotton ball. Gently rub the tape until it is removed.  Changing your dressing  Leave the dressing (bandage) in place until you are told to remove it or change it. Follow the instructions below unless told otherwise by your healthcare provider:    Always wash your hands before changing your dressing.    After the first 48 hours, the incision wound usually will have closed. At this point, leave the incision uncovered and open to the air. If the incision has not closed keep it covered.    Cover your incision only if your clothing is rubbing it or causing irritation.    Change your dressing if it gets wet or soiled.    For comfort, men may wear scrotal support after inguinal hernia repair.    Drainage:   Bleeding or drainage should be minimal.  1. If bleeding should soak the dressings, cover with another dressing. Do not remove the original dressing.  2. If bleeding continues, apply gentle steady pressure over the incision for 5 minutes.  3. If bleeding persists or there is an increased swelling of the area, call your surgeon or return to the Emergency Room.    When to call your healthcare provider:  Call your healthcare provider if you have any of the following:    A large amount of swelling or bruising (Men: may notice testicular swelling and bruising. This is normal)    Fever of 101.5*F (38*C) or higher, or as directed by your healthcare  provider.    Pain, redness, bleeding, or fluid from the incision that gets worse    Drainage with an odor    Trouble urinating    Constipation    Vomiting         QUESTIONS?:  Please feel free to call your healthcare provider or the hospital  if you have any questions, and they will be happy to assist you.         If you have any questions about these instructions, feel free to ask the nurse before discharge.  If you have difficulty after surgery and are unable to contact your physician at his/her clinic, call the [facility] Emergency Room for assistance at [phone].    Post-Anesthesia Patient Instructions    IMMEDIATELY FOLLOWING SURGERY:  Do not drive or operate machinery for the first twenty four hours after surgery.  Do not make any important decisions for twenty four hours after surgery or while taking narcotic pain medications or sedatives.  If you develop intractable nausea and vomiting or a severe headache please notify your doctor immediately.    FOLLOW-UP:  Please make an appointment with your surgeon as instructed. You do not need to follow up with anesthesia unless specifically instructed to do so.    WOUND CARE INSTRUCTIONS (if applicable):  Keep a dry clean dressing on the anesthesia/puncture wound site if there is drainage.  Once the wound has quit draining you may leave it open to air.  Generally you should leave the bandage intact for twenty four hours unless there is drainage.  If the epidural site drains for more than 36-48 hours please call the anesthesia department.    QUESTIONS?:  Please feel free to call your physician or the hospital  if you have any questions, and they will be happy to assist you.       Pending Results     No orders found from 10/31/2018 to 11/3/2018.            Admission Information     Date & Time Provider Department Dept. Phone    11/2/2018 Fernando Carroll MD [facility] PACU [phone]      Your Vitals Were     Blood Pressure Pulse Respirations Height  "Weight Pulse Oximetry    145/90 54 16 1.727 m (5' 8\") 82.1 kg (181 lb) 96%    BMI (Body Mass Index)                   27.52 kg/m2           Care EveryWhere ID     This is your Care EveryWhere ID. This could be used by other organizations to access your [facility] medical records  [id number]        Equal Access to Services     ZACK TORRES AH: Hadii jessica sung hadpietero Sogm, waaxda luqadaha, qaybta kaalmada rachellda, fidencio levinbashirdarvin pat . So [facility] [phone].    ATENCIÓN: Si habla español, tiene a tobias disposición servicios gratuitos de asistencia lingüística. Marj al [phone].    We comply with applicable federal civil rights laws and [place] laws. We do not discriminate on the basis of race, color, national origin, age, disability, sex, sexual orientation, or gender identity.               Review of your medicines      START taking        Dose / Directions    oxyCODONE IR 5 MG tablet   Commonly known as:  ROXICODONE   Used for:  Inguinal hernia of left side without obstruction or gangrene        Dose:  5 mg   Take 1 tablet (5 mg) by mouth every 6 hours as needed for severe pain   Quantity:  15 tablet   Refills:  0         CONTINUE these medicines which have NOT CHANGED        Dose / Directions    aspirin 81 MG EC tablet        Dose:  81 mg   Take 81 mg by mouth daily   Refills:  0       atorvastatin 40 MG tablet   Commonly known as:  LIPITOR        Dose:  1 tablet   Take 1 tablet by mouth At Bedtime   Refills:  3       DILT- MG 24 hr capsule   Generic drug:  diltiazem        Dose:  1 capsule   Take 1 capsule by mouth daily   Refills:  3       metoprolol succinate 200 MG 24 hr tablet   Commonly known as:  TOPROL-XL        Dose:  1 tablet   Take 1 tablet by mouth daily   Refills:  3       MULTIPLE VITAMIN PO        Dose:  1 tablet   Take 1 tablet by mouth daily   Refills:  0       nitroGLYcerin 0.4 MG sublingual tablet   Commonly known as:  NITROSTAT        Dose:  0.4 mg   Place 0.4 mg " under the tongue every 5 minutes as needed for chest pain (MAXIMUM 3 DOSES) For chest pain place 1 tablet under the tongue every 5 minutes for 3 doses. If symptoms persist 5 minutes after 1st dose call 911.   Refills:  0       omega 3 1000 MG Caps        Dose:  1 capsule   Take 1 capsule by mouth daily   Refills:  0       omeprazole 20 MG CR capsule   Commonly known as:  priLOSEC        Dose:  1 capsule   Take 1 capsule by mouth daily   Refills:  0       pramipexole 0.25 MG tablet   Commonly known as:  MIRAPEX        Dose:  1 tablet   Take 1 tablet by mouth every evening   Refills:  2       traMADol 50 MG tablet   Commonly known as:  ULTRAM        Dose:  1 tablet   Take 1 tablet by mouth every 8 hours as needed   Refills:  0       Vitamin D (Cholecalciferol) 1000 units Caps        Dose:  1 capsule   Take 1 capsule by mouth daily   Refills:  0       XARELTO 20 MG Tabs tablet   Generic drug:  rivaroxaban ANTICOAGULANT        Dose:  1 tablet   Take 1 tablet by mouth every morning   Refills:  3            Where to get your medicines      Some of these will need a paper prescription and others can be bought over the counter. Ask your nurse if you have questions.     Bring a paper prescription for each of these medications     oxyCODONE IR 5 MG tablet                Protect others around you: Learn how to safely use, store and throw away your medicines at www.disposemymeds.org.        Information about OPIOIDS     PRESCRIPTION OPIOIDS: WHAT YOU NEED TO KNOW   We gave you an opioid (narcotic) pain medicine. It is important to manage your pain, but opioids are not always the best choice. You should first try all the other options your care team gave you. Take this medicine for as short a time (and as few doses) as possible.    Some activities can increase your pain, such as bandage changes or therapy sessions. It may help to take your pain medicine 30 to 60 minutes before these activities. Reduce your stress by getting  enough sleep, working on hobbies you enjoy and practicing relaxation or meditation. Talk to your care team about ways to manage your pain beyond prescription opioids.    These medicines have risks:    DO NOT drive when on new or higher doses of pain medicine. These medicines can affect your alertness and reaction times, and you could be arrested for driving under the influence (DUI). If you need to use opioids long-term, talk to your care team about driving.    DO NOT operate heavy machinery    DO NOT do any other dangerous activities while taking these medicines.    DO NOT drink any alcohol while taking these medicines.     If the opioid prescribed includes acetaminophen, DO NOT take with any other medicines that contain acetaminophen. Read all labels carefully. Look for the word  acetaminophen  or  Tylenol.  Ask your pharmacist if you have questions or are unsure.    You can get addicted to pain medicines, especially if you have a history of addiction (chemical, alcohol or substance dependence). Talk to your care team about ways to reduce this risk.    All opioids tend to cause constipation. Drink plenty of water and eat foods that have a lot of fiber, such as fruits, vegetables, prune juice, apple juice and high-fiber cereal. Take a laxative (Miralax, milk of magnesia, Colace, Senna) if you don t move your bowels at least every other day. Other side effects include upset stomach, sleepiness, dizziness, throwing up, tolerance (needing more of the medicine to have the same effect), physical dependence and slowed breathing.    Store your pills in a secure place, locked if possible. We will not replace any lost or stolen medicine. If you don t finish your medicine, please throw away (dispose) as directed by your pharmacist. The [place] [employer] has more information about safe disposal: https://www.pca.[email or web address]/living-green/managing-unwanted-medications             Medication List: This is a list  of all your medications and when to take them. Check marks below indicate your daily home schedule. Keep this list as a reference.      Medications           Morning Afternoon Evening Bedtime As Needed    aspirin 81 MG EC tablet   Take 81 mg by mouth daily                                atorvastatin 40 MG tablet   Commonly known as:  LIPITOR   Take 1 tablet by mouth At Bedtime                                DILT- MG 24 hr capsule   Take 1 capsule by mouth daily   Generic drug:  diltiazem                                metoprolol succinate 200 MG 24 hr tablet   Commonly known as:  TOPROL-XL   Take 1 tablet by mouth daily                                MULTIPLE VITAMIN PO   Take 1 tablet by mouth daily                                nitroGLYcerin 0.4 MG sublingual tablet   Commonly known as:  NITROSTAT   Place 0.4 mg under the tongue every 5 minutes as needed for chest pain (MAXIMUM 3 DOSES) For chest pain place 1 tablet under the tongue every 5 minutes for 3 doses. If symptoms persist 5 minutes after 1st dose call 911.                                omega 3 1000 MG Caps   Take 1 capsule by mouth daily                                omeprazole 20 MG CR capsule   Commonly known as:  priLOSEC   Take 1 capsule by mouth daily                                oxyCODONE IR 5 MG tablet   Commonly known as:  ROXICODONE   Take 1 tablet (5 mg) by mouth every 6 hours as needed for severe pain   Last time this was given:  5 mg on 11/2/2018  3:03 PM                                pramipexole 0.25 MG tablet   Commonly known as:  MIRAPEX   Take 1 tablet by mouth every evening                                traMADol 50 MG tablet   Commonly known as:  ULTRAM   Take 1 tablet by mouth every 8 hours as needed                                Vitamin D (Cholecalciferol) 1000 units Caps   Take 1 capsule by mouth daily                                XARELTO 20 MG Tabs tablet   Take 1 tablet by mouth every morning   Generic drug:   rivaroxaban ANTICOAGULANT

## 2018-11-05 NOTE — OP NOTE
Procedure Date: 11/02/2018      PREOPERATIVE DIAGNOSIS:  Left inguinal hernia.      POSTOPERATIVE DIAGNOSIS:  Left indirect inguinal hernia.      PROCEDURE:  Repair of left indirect inguinal hernia with mesh.      SURGEON:  Fernando Carroll MD      ANESTHETIC:  Local, MAC.      BLOOD LOSS:  Less than 50 mL.      SPECIMEN:  Nil.      COMPLICATIONS:  Nil.      INDICATIONS:  Mr. Worrell is a 78-year-old male who presents with a left inguinal hernia.  This started to bother him a few months ago.  He has noticed a lump and does tend to cause him pain if he is on his feet for any length of time or is doing any lifting.  On examination, he does have a left inguinal hernia which is reducible with a bit of difficulty.  The patient has had previous coronary artery stenting.  He is on Xarelto.  He is otherwise healthy.  It was felt that the hernia should be repaired for him.      DESCRIPTION OF PROCEDURE:  The patient was prepped and brought to the operating room.  Under MAC anesthesia, the abdomen was prepped with ChloraPrep and draped in the usual fashion.  An appropriate timeout was carried out.  Xylocaine 2% without epinephrine was then infiltrated into the left groin and gave good analgesia.  An oblique incision was made and carried down through the subcutaneous tissue to expose the external oblique.  Hemostasis was achieved with cautery and 2-0 Vicryl ties.  The external oblique was opened in the direction of its fibers through the external ring, taking care to protect the ilioinguinal nerve.  The spermatic cord was dissected away from the pubic tubercle and controlled with a Penrose drain.  He did have what appeared to be an indirect hernia.  There is some very slight weakness in the backwall.  The cremasteric fibers of the cord were opened.  A lipoma of the cord was identified and this was dissected back to the internal ring and amputated and then tied with a 2-0 Vicryl tie.  We then started to dissect out what  appeared to be the hernia sac, but indeed as we were dissecting out, it was obvious that it was a loop of the sigmoid colon that was coming down through the internal ring and lying inside the cord.  There was really no identifiable hernia sac.  It really was a sliding type hernia, but just some flimsy material that was around it.  The colon was then dissected away from the spermatic cord contents and reduced back into the abdomen.  I was able to caudal together some tissue at the internal ring, which was basically some fibrous material to close off the base.  This was done with a pursestring suture of 2-0 chromic.  The spermatic cord contents were excluded from the pursestring.  This did reduce the bowel.      We then went on to repair the hernia with mesh.  A piece of Parietex soft mesh was then chosen.  This was trimmed to size and a slit for the cord was made.  This was anchored to the pubic tubercle and Juan's ligament with interrupted 2-0 Prolene.  Inferiorly, the mesh was attached to the recurrent border of the inguinal ligament with running 2-0 Prolene and carried out well laterally.  Superiorly, the mesh attached to the conjoined tendon with running 2-0 Prolene and again carried out well laterally.  The slit for the cord was then tightened with interrupted 2-0 Prolene.  This appeared to give a good repair without tension.  A search for bleeding was made.  Marcaine 0.5% without epinephrine was infiltrated around the repair.      External oblique was then closed with a running 2-0 Vicryl.  Subcutaneous tissue was closed in layers with interrupted 2-0 and 3-0 Vicryl.  Skin was closed with a running 3-0 Prolene in a subcuticular fashion.  A gauze and Tegaderm dressing was applied.      He tolerated the procedure well.  He was returned to the recovery room in good condition.  At the end of procedure, sponge, instrument and needle count was correct as reported by the nurses.  He will be discharged home later  today.  He will be seen in the clinic next Thursday for suture removal.  He can restart his Xarelto on .         SOLEDAD GARCIA MD             D: 2018   T: 2018   MT: SUJIT      Name:     SMITA BECK   MRN:      [id number]        Account:        [id number]   :      1940           Procedure Date: 2018      Document: [id number]       cc: Anand Bertrand MD

## 2018-11-08 ENCOUNTER — OFFICE VISIT (OUTPATIENT)
Dept: SURGERY | Facility: OTHER | Age: 78
End: 2018-11-08
Attending: SURGERY
Payer: COMMERCIAL

## 2018-11-08 VITALS
HEART RATE: 75 BPM | BODY MASS INDEX: 26.83 KG/M2 | OXYGEN SATURATION: 97 % | HEIGHT: 68 IN | DIASTOLIC BLOOD PRESSURE: 68 MMHG | WEIGHT: 177 LBS | SYSTOLIC BLOOD PRESSURE: 112 MMHG | TEMPERATURE: 97.6 F

## 2018-11-08 DIAGNOSIS — K40.90 INGUINAL HERNIA OF LEFT SIDE WITHOUT OBSTRUCTION OR GANGRENE: Primary | ICD-10-CM

## 2018-11-08 PROBLEM — E78.5 HYPERLIPIDEMIA: Status: ACTIVE | Noted: 2018-10-18

## 2018-11-08 PROCEDURE — 99024 POSTOP FOLLOW-UP VISIT: CPT | Performed by: SURGERY

## 2018-11-08 PROCEDURE — G0463 HOSPITAL OUTPT CLINIC VISIT: HCPCS

## 2018-11-08 ASSESSMENT — PAIN SCALES - GENERAL: PAINLEVEL: NO PAIN (0)

## 2018-11-08 NOTE — NURSING NOTE
"Chief Complaint   Patient presents with     Surgical Followup     s/p-repair of left indirect hernia with mesh 11/2/18.  No problems.         Initial /68 (BP Location: Right arm, Patient Position: Chair, Cuff Size: Adult Large)  Pulse 75  Temp 97.6  F (36.4  C) (Tympanic)  Ht 5' 8\" (1.727 m)  Wt 177 lb (80.3 kg)  SpO2 97%  BMI 26.91 kg/m2 Estimated body mass index is 26.91 kg/(m^2) as calculated from the following:    Height as of this encounter: 5' 8\" (1.727 m).    Weight as of this encounter: 177 lb (80.3 kg).  Medication Reconciliation: complete    MICHAEL KNIGHT LPN    "

## 2018-11-08 NOTE — MR AVS SNAPSHOT
"              After Visit Summary   11/8/2018    Ian Worrell    MRN: [id number]           Patient Information     Date Of Birth          1940        Visit Information        Provider Department      11/8/2018 10:30 AM Fernando Carroll MD [facility]         Follow-ups after your visit        Your next 10 appointments already scheduled     Nov 30, 2018 10:30 AM CST   (Arrive by 10:15 AM)   Post Op with Fernando Carroll MD   [facility] ([facility] )    [address] [place] [address]   [phone]              Who to contact     If you have questions or need follow up information about today's clinic visit or your schedule please contact [facility] directly at [phone].  Normal or non-critical lab and imaging results will be communicated to you by MyChart, letter or phone within 4 business days after the clinic has received the results. If you do not hear from us within 7 days, please contact the clinic through MyChart or phone. If you have a critical or abnormal lab result, we will notify you by phone as soon as possible.  Submit refill requests through [facility] or call your pharmacy and they will forward the refill request to us. Please allow 3 business days for your refill to be completed.          Additional Information About Your Visit        Care EveryWhere ID     This is your Care EveryWhere ID. This could be used by other organizations to access your [facility] medical records  [id number]        Your Vitals Were     Pulse Temperature Height Pulse Oximetry BMI (Body Mass Index)       75 97.6  F (36.4  C) (Tympanic) 5' 8\" (1.727 m) 97% 26.91 kg/m2        Blood Pressure from Last 3 Encounters:   11/08/18 112/68   11/02/18 144/83   10/17/18 100/66    Weight from Last 3 Encounters:   11/08/18 177 lb (80.3 kg)   11/02/18 181 lb (82.1 kg)   10/17/18 177 lb (80.3 kg)              Today, you had the following     " No orders found for display         Today's Medication Changes          These changes are accurate as of 11/8/18 10:36 AM.  If you have any questions, ask your nurse or doctor.               Stop taking these medicines if you haven't already. Please contact your care team if you have questions.     oxyCODONE IR 5 MG tablet   Commonly known as:  ROXICODONE   Stopped by:  Fernando Carroll MD                    Primary Care Provider Office Phone # Fax #    Anand Bertrand [phone]       [facility] [address] [address]        Equal Access to Services     [facility]: Hadii aad ku hadasho Soomaali, waaxda luqadaha, qaybta kaalmada adeegyaaisha, fidencio pat . So [facility] [phone].    ATENCIÓN: Si habla español, tiene a tobias disposición servicios gratuitos de asistencia lingüística. [facility] [phone].    We comply with applicable federal civil rights laws and [place] laws. We do not discriminate on the basis of race, color, national origin, age, disability, sex, sexual orientation, or gender identity.            Thank you!     Thank you for choosing [facility]  for your care. Our goal is always to provide you with excellent care. Hearing back from our patients is one way we can continue to improve our services. Please take a few minutes to complete the written survey that you may receive in the mail after your visit with us. Thank you!             Your Updated Medication List - Protect others around you: Learn how to safely use, store and throw away your medicines at www.disposemymeds.org.          This list is accurate as of 11/8/18 10:36 AM.  Always use your most recent med list.                   Brand Name Dispense Instructions for use Diagnosis    aspirin 81 MG EC tablet      Take 81 mg by mouth daily        atorvastatin 40 MG tablet    LIPITOR     Take 1 tablet by mouth At Bedtime        DILT- MG 24 hr capsule    Generic drug:  diltiazem      Take 1 capsule by mouth daily        metoprolol succinate 200 MG 24 hr tablet    TOPROL-XL     Take 1 tablet by mouth daily        MULTIPLE VITAMIN PO      Take 1 tablet by mouth daily        nitroGLYcerin 0.4 MG sublingual tablet    NITROSTAT     Place 0.4 mg under the tongue every 5 minutes as needed for chest pain (MAXIMUM 3 DOSES) For chest pain place 1 tablet under the tongue every 5 minutes for 3 doses. If symptoms persist 5 minutes after 1st dose call 911.        omega 3 1000 MG Caps      Take 1 capsule by mouth daily        omeprazole 20 MG CR capsule    priLOSEC     Take 1 capsule by mouth daily        pramipexole 0.25 MG tablet    MIRAPEX     Take 1 tablet by mouth every evening        traMADol 50 MG tablet    ULTRAM     Take 1 tablet by mouth every 8 hours as needed        Vitamin D (Cholecalciferol) 1000 units Caps      Take 1 capsule by mouth daily        XARELTO 20 MG Tabs tablet   Generic drug:  rivaroxaban ANTICOAGULANT      Take 1 tablet by mouth every morning

## 2018-11-09 NOTE — PROGRESS NOTES
Visit Date:   11/08/2018      Mr. Worrell was seen today for followup of his left inguinal hernia repair carried out last Friday.  He did have an indirect hernia which was a bit unusual in that it did not have a well-defined hernia sac.  He did end up having a repair with mesh.  Post-procedure he has been doing well.  His local anesthetic wore off on Saturday and he started having some discomfort but seems to have this under good control and it has been getting better.  He has been well enough that he has been able go up to [facility].  He denies any fever.  Bowels have been functioning.  He has been eating.  He has noted a bit of straining with bowel movements and a bit of blood, likely secondary to his narcotic.      PHYSICAL EXAMINATION:  On examination today, he looks well.  He is moving around quite well.  The dressing was removed.  He has some minimal bruising around the incision and down into the scrotum.  There is no evidence of any infection.  Does have a bit of edema present.  The suture was removed and some Steri-Strips applied.      I also had a look at the perianal region.  He does not have any external hemorrhoids or perianal hematoma.      As far as the hernia is concerned, he seems to be doing quite well.  He can bath and shower.  Steri-Strips can come off in 2-3 days.  The bruising should resolve in the next week or so.  Soreness should also improve.  I have asked him to avoid lifting for another 5 weeks.  He can do as much walking as possible.  He is going to follow up with me when I return in early December.      As far as the perianal discomfort, I suspect he has irritated his hemorrhoids with the narcotic.  He has apparently had the same trouble when he had his shoulder surgery.  He is going to try some Preparation-H.  I also suggested some Sitz baths.  I suspect things will settle without difficulty as he gets off the pain medications.  He was instructed again not to use nonsteroidal  anti-inflammatories with the Xarelto that he is on.  He is primarily using Tylenol for pain.         SOLEDAD GARCIA MD             D: 2018   T: 2018   MT: AMADO      Name:     SMITA BECK   MRN:      [id number]        Account:      [id number]   :      1940           Visit Date:   2018      Document: [id number]       cc: Anand Bertrand MD

## 2018-11-30 ENCOUNTER — OFFICE VISIT (OUTPATIENT)
Dept: SURGERY | Facility: OTHER | Age: 78
End: 2018-11-30
Attending: SURGERY
Payer: COMMERCIAL

## 2018-11-30 VITALS
OXYGEN SATURATION: 96 % | DIASTOLIC BLOOD PRESSURE: 72 MMHG | BODY MASS INDEX: 26.37 KG/M2 | RESPIRATION RATE: 16 BRPM | HEART RATE: 82 BPM | HEIGHT: 68 IN | SYSTOLIC BLOOD PRESSURE: 100 MMHG | WEIGHT: 174 LBS | TEMPERATURE: 97.6 F

## 2018-11-30 DIAGNOSIS — K40.90 INGUINAL HERNIA OF LEFT SIDE WITHOUT OBSTRUCTION OR GANGRENE: Primary | ICD-10-CM

## 2018-11-30 PROCEDURE — G0463 HOSPITAL OUTPT CLINIC VISIT: HCPCS

## 2018-11-30 PROCEDURE — 99024 POSTOP FOLLOW-UP VISIT: CPT | Performed by: SURGERY

## 2018-11-30 ASSESSMENT — PAIN SCALES - GENERAL: PAINLEVEL: NO PAIN (1)

## 2018-11-30 NOTE — MR AVS SNAPSHOT
"              After Visit Summary   11/30/2018    Ina Worrell    MRN: [id number]           Patient Information     Date Of Birth          1940        Visit Information        Provider Department      11/30/2018 10:30 AM Fernando Carroll MD [facility]        Today's Diagnoses     Inguinal hernia of left side without obstruction or gangrene    -  1       Follow-ups after your visit        Who to contact     If you have questions or need follow up information about today's clinic visit or your schedule please contact [facility] directly at [phone].  Normal or non-critical lab and imaging results will be communicated to you by MyChart, letter or phone within 4 business days after the clinic has received the results. If you do not hear from us within 7 days, please contact the clinic through MyChart or phone. If you have a critical or abnormal lab result, we will notify you by phone as soon as possible.  Submit refill requests through [facility] or call your pharmacy and they will forward the refill request to us. Please allow 3 business days for your refill to be completed.          Additional Information About Your Visit        Care EveryWhere ID     This is your Care EveryWhere ID. This could be used by other organizations to access your [place] medical records  [id number]        Your Vitals Were     Pulse Temperature Respirations Height Pulse Oximetry BMI (Body Mass Index)    82 97.6  F (36.4  C) (Tympanic) 16 5' 8\" (1.727 m) 96% 26.46 kg/m2       Blood Pressure from Last 3 Encounters:   11/30/18 100/72   11/08/18 112/68   11/02/18 144/83    Weight from Last 3 Encounters:   11/30/18 174 lb (78.9 kg)   11/08/18 177 lb (80.3 kg)   11/02/18 181 lb (82.1 kg)              Today, you had the following     No orders found for display         Today's Medication Changes          These changes are accurate as of 11/30/18  7:17 PM.  If you have any questions, ask your " nurse or doctor.               Stop taking these medicines if you haven't already. Please contact your care team if you have questions.     pramipexole 0.25 MG tablet   Commonly known as:  MIRAPEX   Stopped by:  Fernando Carroll MD                    Primary Care Provider Office Phone # Fax #    Anand Bertrand [phone]       [place] [address] [address]        Equal Access to Services     [facility] : Hadii aad ku hadasho Soomaali, waaxda luqadaha, qaybta kaalmada adeegyada, waxay idiin hayaan adeeg kharash lanat . So [facility] [phone].    ATENCIÓN: Si habla español, tiene a tobias disposición servicios gratuitos de asistencia lingüística. [facility] [phone].    We comply with applicable federal civil rights laws and [place] laws. We do not discriminate on the basis of race, color, national origin, age, disability, sex, sexual orientation, or gender identity.            Thank you!     Thank you for choosing [facility] [place] [facility]  for your care. Our goal is always to provide you with excellent care. Hearing back from our patients is one way we can continue to improve our services. Please take a few minutes to complete the written survey that you may receive in the mail after your visit with us. Thank you!             Your Updated Medication List - Protect others around you: Learn how to safely use, store and throw away your medicines at www.disposemymeds.org.          This list is accurate as of 11/30/18  7:17 PM.  Always use your most recent med list.                   Brand Name Dispense Instructions for use Diagnosis    aspirin 81 MG EC tablet      Take 81 mg by mouth daily        atorvastatin 40 MG tablet    LIPITOR     Take 1 tablet by mouth At Bedtime        DILT- MG 24 hr capsule   Generic drug:  diltiazem ER      Take 1 capsule by mouth daily        metoprolol succinate  MG 24 hr tablet    TOPROL-XL     Take 1 tablet by mouth daily         MULTIPLE VITAMIN PO      Take 1 tablet by mouth daily        nitroGLYcerin 0.4 MG sublingual tablet    NITROSTAT     Place 0.4 mg under the tongue every 5 minutes as needed for chest pain (MAXIMUM 3 DOSES) For chest pain place 1 tablet under the tongue every 5 minutes for 3 doses. If symptoms persist 5 minutes after 1st dose call 911.        omega 3 1000 MG Caps      Take 1 capsule by mouth daily        omeprazole 20 MG DR capsule    priLOSEC     Take 1 capsule by mouth daily        traMADol 50 MG tablet    ULTRAM     Take 1 tablet by mouth every 8 hours as needed        Vitamin D (Cholecalciferol) 1000 units Caps      Take 1 capsule by mouth daily        XARELTO 20 MG Tabs tablet   Generic drug:  rivaroxaban ANTICOAGULANT      Take 1 tablet by mouth every morning

## 2018-11-30 NOTE — NURSING NOTE
"Chief Complaint   Patient presents with     Surgical Followup     post op Left inguinal hernia repair 11-2-2018, doing well, some pain at times       Initial /72 (BP Location: Left arm, Patient Position: Sitting, Cuff Size: Adult Regular)  Pulse 82  Temp 97.6  F (36.4  C) (Tympanic)  Resp 16  Ht 5' 8\" (1.727 m)  Wt 174 lb (78.9 kg)  SpO2 96%  BMI 26.46 kg/m2 Estimated body mass index is 26.46 kg/(m^2) as calculated from the following:    Height as of this encounter: 5' 8\" (1.727 m).    Weight as of this encounter: 174 lb (78.9 kg).  Medication Reconciliation: complete    Chelsea Raman LPN    "

## 2018-12-02 NOTE — PROGRESS NOTES
Visit Date:   11/30/2018      Mr. Beck was seen today for followup of his left inguinal hernia repair carried out 4 weeks ago.  He does state that he is doing quite well.  He has not had any pain in the area for over a week now.  He has been eating and bowels have been functioning.  He has noted the swelling has settled down significantly.      PHYSICAL EXAMINATION:  On examination today, he looks well.  The incision looks quite good.  There is some minimal thickening along the incision which will settle down with time.  The repair does feel quite solid.  He is nontender.  Testicles are normal.  I could not feel a hernia on the right side.      At the moment, he seems to be doing quite well.  I reassured him that the thickening will settle down with time.  He can slowly start returning to normal activities.  When he gets to the 6-week aquilino, he can return to full activities with no restriction on lifting.  At the moment, he seems quite happy with the results of the surgery.  I felt I did not need to see him again unless there are difficulties.      He did ask me about an ingrown toenail today.  He has had some difficulties with the toenail on the great toe of his left foot.  He has never had troubles before.  It started to bother him recently.  He did try and trim the nail with no relief.  On examination today, he has shortened the nail significantly.  He still has a spigot of the nail which is extending along the medial border.  I was able to cut this out with some sharp scissors today.  There was a small amount of pus that was present under the nail.  Following this, he did feel significantly better.  I have recommended that he soak the toe a couple times a day.  If he has persistent troubles, then he should probably follow up with a podiatrist.         SOLEDAD GARCIA MD             D: 11/30/2018   T: 12/01/2018   MT: AMADO      Name:     SMITA BECK   MRN:      [id number]        Account:      [id number]    :      1940           Visit Date:   2018      Document: [id number]       cc: Anand Bertrand MD

## 2021-12-17 NOTE — INTERVAL H&P NOTE
Spoke to patient he States he was seen at [facility] ED 12/16/21 and he was told he was Covid 19 positive. Also informed of abnormal chest xray. Requesting to been seen to discuss ed visit.. appointment scheduled and patient verbalized understanding    nlhp

## (undated) DEVICE — NDL-25G 1-1/2" NON-SAFETY

## (undated) DEVICE — PACK-LAPAROTOMY-CUSTOM

## (undated) DEVICE — SUTURE-PROLENE 2-0 SH 8833H

## (undated) DEVICE — SUTURE-VICRYL 3-0 SH J416H

## (undated) DEVICE — CAUTERY PAD-POLYHESIVE II ADULT

## (undated) DEVICE — NDL-BLUNT FILL 18G 1.5"

## (undated) DEVICE — SYRINGE-10CC LUER LOCK

## (undated) DEVICE — IRRIGATION-NACL 1000ML

## (undated) DEVICE — MARKER-SKIN REG

## (undated) DEVICE — SENSOR-OXISENSOR II ADULT

## (undated) DEVICE — SUTURE-VICRYL 2-0 CT-1 J945H

## (undated) DEVICE — APPLICATOR-CHLORAPREP 26ML TINTED CHG 2%+ 70% IPA-SURGICAL

## (undated) DEVICE — IRRIGATION-H2O 1000ML

## (undated) DEVICE — DRSG-SPONGE STERILE 4 X 4

## (undated) DEVICE — SUTURE-VICRYL 2-0 TIE J105T

## (undated) DEVICE — Device

## (undated) DEVICE — SUTURE-CHROMIC GUT 2-0 SH G123H

## (undated) DEVICE — SUTURE-PROLENE 3-0 PS-1 8663G

## (undated) DEVICE — PENROSE DRAIN 1/2 X 18 LATEX]

## (undated) DEVICE — BIN-COVIDIEN MESH BIN

## (undated) DEVICE — NDL-SPINAL 22G X 3.5IN QUINCKE

## (undated) DEVICE — GLV-7.0 PROTEXIS PI CLASSIC LF/PF

## (undated) DEVICE — SPONGE-PEANUT 3/8"

## (undated) DEVICE — SCD SLEEVE-KNEE REG.

## (undated) DEVICE — DRSG-TEGADERM MEDIUM #1626

## (undated) DEVICE — LABEL-STERILE PREPRINTED FOR OR

## (undated) DEVICE — LIGHT HANDLE COVER

## (undated) DEVICE — CANISTER-SUCTION 2000CC

## (undated) RX ORDER — PROPOFOL 10 MG/ML
INJECTION, EMULSION INTRAVENOUS
Status: DISPENSED
Start: 2018-11-02

## (undated) RX ORDER — ONDANSETRON 2 MG/ML
INJECTION INTRAMUSCULAR; INTRAVENOUS
Status: DISPENSED
Start: 2018-11-02

## (undated) RX ORDER — FENTANYL CITRATE 50 UG/ML
INJECTION, SOLUTION INTRAMUSCULAR; INTRAVENOUS
Status: DISPENSED
Start: 2018-11-02

## (undated) RX ORDER — EPHEDRINE SULFATE 50 MG/ML
INJECTION, SOLUTION INTRAMUSCULAR; INTRAVENOUS; SUBCUTANEOUS
Status: DISPENSED
Start: 2018-11-02